# Patient Record
Sex: MALE | Race: WHITE
[De-identification: names, ages, dates, MRNs, and addresses within clinical notes are randomized per-mention and may not be internally consistent; named-entity substitution may affect disease eponyms.]

---

## 2019-04-08 ENCOUNTER — HOSPITAL ENCOUNTER (OUTPATIENT)
Dept: HOSPITAL 75 - SDC | Age: 44
Discharge: HOME | End: 2019-04-08
Attending: NURSE PRACTITIONER
Payer: COMMERCIAL

## 2019-04-08 VITALS — WEIGHT: 236 LBS | HEIGHT: 74 IN | BODY MASS INDEX: 30.29 KG/M2

## 2019-04-08 VITALS — DIASTOLIC BLOOD PRESSURE: 94 MMHG | SYSTOLIC BLOOD PRESSURE: 141 MMHG

## 2019-04-08 DIAGNOSIS — R07.9: ICD-10-CM

## 2019-04-08 DIAGNOSIS — R06.00: ICD-10-CM

## 2019-04-08 DIAGNOSIS — M79.89: ICD-10-CM

## 2019-04-08 DIAGNOSIS — E86.0: Primary | ICD-10-CM

## 2019-04-08 LAB
ALBUMIN SERPL-MCNC: 4.5 GM/DL (ref 3.2–4.5)
ALP SERPL-CCNC: 73 U/L (ref 40–136)
ALT SERPL-CCNC: 46 U/L (ref 0–55)
APTT PPP: YELLOW S
BACTERIA #/AREA URNS HPF: (no result) /HPF
BILIRUB SERPL-MCNC: 1.8 MG/DL (ref 0.1–1)
BILIRUB UR QL STRIP: NEGATIVE
BUN/CREAT SERPL: 18
CALCIUM SERPL-MCNC: 9.6 MG/DL (ref 8.5–10.1)
CHLORIDE SERPL-SCNC: 107 MMOL/L (ref 98–107)
CO2 SERPL-SCNC: 24 MMOL/L (ref 21–32)
CREAT SERPL-MCNC: 0.77 MG/DL (ref 0.6–1.3)
ERYTHROCYTE [DISTWIDTH] IN BLOOD BY AUTOMATED COUNT: 13.3 % (ref 10–14.5)
FIBRINOGEN PPP-MCNC: CLEAR MG/DL
GFR SERPLBLD BASED ON 1.73 SQ M-ARVRAT: > 60 ML/MIN
GLUCOSE SERPL-MCNC: 107 MG/DL (ref 70–105)
GLUCOSE UR STRIP-MCNC: NEGATIVE MG/DL
HCT VFR BLD CALC: 45 % (ref 40–54)
HGB BLD-MCNC: 15.7 G/DL (ref 13.3–17.7)
KETONES UR QL STRIP: NEGATIVE
LEUKOCYTE ESTERASE UR QL STRIP: NEGATIVE
MCH RBC QN AUTO: 33 PG (ref 25–34)
MCHC RBC AUTO-ENTMCNC: 35 G/DL (ref 32–36)
MCV RBC AUTO: 95 FL (ref 80–99)
NITRITE UR QL STRIP: NEGATIVE
PH UR STRIP: 8 [PH] (ref 5–9)
PLATELET # BLD: 124 10^3/UL (ref 130–400)
PMV BLD AUTO: 10 FL (ref 7.4–10.4)
POTASSIUM SERPL-SCNC: 4 MMOL/L (ref 3.6–5)
PROT SERPL-MCNC: 6.9 GM/DL (ref 6.4–8.2)
PROT UR QL STRIP: NEGATIVE
RBC #/AREA URNS HPF: (no result) /HPF
SODIUM SERPL-SCNC: 138 MMOL/L (ref 135–145)
SP GR UR STRIP: 1.01 (ref 1.02–1.02)
SQUAMOUS #/AREA URNS HPF: (no result) /HPF
UROBILINOGEN UR-MCNC: 1 MG/DL
WBC # BLD AUTO: 5 10^3/UL (ref 4.3–11)
WBC #/AREA URNS HPF: (no result) /HPF

## 2019-04-08 PROCEDURE — 84484 ASSAY OF TROPONIN QUANT: CPT

## 2019-04-08 PROCEDURE — 96360 HYDRATION IV INFUSION INIT: CPT

## 2019-04-08 PROCEDURE — 80053 COMPREHEN METABOLIC PANEL: CPT

## 2019-04-08 PROCEDURE — 85379 FIBRIN DEGRADATION QUANT: CPT

## 2019-04-08 PROCEDURE — 93005 ELECTROCARDIOGRAM TRACING: CPT

## 2019-04-08 PROCEDURE — 81000 URINALYSIS NONAUTO W/SCOPE: CPT

## 2019-04-08 PROCEDURE — 85027 COMPLETE CBC AUTOMATED: CPT

## 2019-04-08 PROCEDURE — 36415 COLL VENOUS BLD VENIPUNCTURE: CPT

## 2019-04-08 NOTE — NUR
PRIOR TO DISMISSAL, LABS RESULTS FAXED TO DR RACHEL'S OFFICE WITH PHONE CALL TO OFFICE TO 
VERIFY RECEIPT OF SAME.  NO NEW ORDERS, BRITTANY VASQUEZ TO CALL PRESCRIPTIONS TO PT'S 
PHARMACY.  INFORMED PT OF THIS.

## 2019-04-08 NOTE — DIAGNOSTIC IMAGING REPORT
CLINICAL INDICATION: Patient with left leg swelling.



COMPARISON: None.



PROCEDURE:



Real-time left lower extremity venous Doppler duplex evaluation

is performed from the inguinal region through the popliteal

fossa. The calf venous structures are also evaluated.



FINDINGS:



The deep venous system is well visualized and is easily

compressible. There is no evidence of deep venous thrombosis,

valvular incompetence, or significant collateral circulation.

There is soft tissue edema involving the left lower extremity.



IMPRESSION:

There is no ultrasound Doppler evidence of deep venous thrombosis

in the left lower extremity. 



Dictated by: 



  Dictated on workstation # [id number]

## 2019-04-25 ENCOUNTER — HOSPITAL ENCOUNTER (OUTPATIENT)
Dept: HOSPITAL 75 - CARD | Age: 44
End: 2019-04-25
Payer: COMMERCIAL

## 2019-04-25 DIAGNOSIS — I50.9: Primary | ICD-10-CM

## 2019-04-25 PROCEDURE — 93306 TTE W/DOPPLER COMPLETE: CPT

## 2020-06-15 ENCOUNTER — HOSPITAL ENCOUNTER (OUTPATIENT)
Dept: HOSPITAL 75 - LAB | Age: 45
End: 2020-06-15
Attending: FAMILY MEDICINE
Payer: SELF-PAY

## 2020-06-15 DIAGNOSIS — I10: Primary | ICD-10-CM
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
## 2020-06-15 DIAGNOSIS — M25.50: ICD-10-CM

## 2020-06-15 DIAGNOSIS — R53.83: ICD-10-CM

## 2020-06-15 DIAGNOSIS — R60.0: ICD-10-CM

## 2020-06-15 LAB
ALBUMIN SERPL-MCNC: 4.9 GM/DL (ref 3.2–4.5)
ALP SERPL-CCNC: 96 U/L (ref 40–136)
ALT SERPL-CCNC: 55 U/L (ref 0–55)
BASOPHILS # BLD AUTO: 0 10^3/UL (ref 0–0.1)
BASOPHILS NFR BLD AUTO: 0 % (ref 0–10)
BILIRUB SERPL-MCNC: 1.5 MG/DL (ref 0.1–1)
BUN/CREAT SERPL: 11
CALCIUM SERPL-MCNC: 9.5 MG/DL (ref 8.5–10.1)
CHLORIDE SERPL-SCNC: 104 MMOL/L (ref 98–107)
CO2 SERPL-SCNC: 22 MMOL/L (ref 21–32)
CREAT SERPL-MCNC: 0.88 MG/DL (ref 0.6–1.3)
EOSINOPHIL # BLD AUTO: 0.1 10^3/UL (ref 0–0.3)
EOSINOPHIL NFR BLD AUTO: 2 % (ref 0–10)
ERYTHROCYTE [DISTWIDTH] IN BLOOD BY AUTOMATED COUNT: 12.9 % (ref 10–14.5)
GFR SERPLBLD BASED ON 1.73 SQ M-ARVRAT: > 60 ML/MIN
GLUCOSE SERPL-MCNC: 110 MG/DL (ref 70–105)
HCT VFR BLD CALC: 44 % (ref 40–54)
HGB BLD-MCNC: 15.2 G/DL (ref 13.3–17.7)
LYMPHOCYTES # BLD AUTO: 0.9 X 10^3 (ref 1–4)
LYMPHOCYTES NFR BLD AUTO: 16 % (ref 12–44)
MANUAL DIFFERENTIAL PERFORMED BLD QL: NO
MCH RBC QN AUTO: 33 PG (ref 25–34)
MCHC RBC AUTO-ENTMCNC: 34 G/DL (ref 32–36)
MCV RBC AUTO: 95 FL (ref 80–99)
MONOCYTES # BLD AUTO: 0.6 X 10^3 (ref 0–1)
MONOCYTES NFR BLD AUTO: 10 % (ref 0–12)
NEUTROPHILS # BLD AUTO: 4.1 X 10^3 (ref 1.8–7.8)
NEUTROPHILS NFR BLD AUTO: 72 % (ref 42–75)
PLATELET # BLD: 142 10^3/UL (ref 130–400)
PMV BLD AUTO: 10.2 FL (ref 7.4–10.4)
POTASSIUM SERPL-SCNC: 4 MMOL/L (ref 3.6–5)
PROT SERPL-MCNC: 7.9 GM/DL (ref 6.4–8.2)
SODIUM SERPL-SCNC: 138 MMOL/L (ref 135–145)
WBC # BLD AUTO: 5.7 10^3/UL (ref 4.3–11)

## 2020-06-15 PROCEDURE — 86618 LYME DISEASE ANTIBODY: CPT

## 2020-06-15 PROCEDURE — 86668 FRANCISELLA TULARENSIS: CPT

## 2020-06-15 PROCEDURE — 36415 COLL VENOUS BLD VENIPUNCTURE: CPT

## 2020-06-15 PROCEDURE — 84443 ASSAY THYROID STIM HORMONE: CPT

## 2020-06-15 PROCEDURE — 80053 COMPREHEN METABOLIC PANEL: CPT

## 2020-06-15 PROCEDURE — 86666 EHRLICHIA ANTIBODY: CPT

## 2020-06-15 PROCEDURE — 86757 RICKETTSIA ANTIBODY: CPT

## 2020-06-15 PROCEDURE — 85025 COMPLETE CBC W/AUTO DIFF WBC: CPT

## 2021-07-18 ENCOUNTER — HOSPITAL ENCOUNTER (EMERGENCY)
Dept: HOSPITAL 75 - ER | Age: 46
Discharge: HOME | End: 2021-07-18
Payer: SELF-PAY

## 2021-07-18 VITALS — SYSTOLIC BLOOD PRESSURE: 110 MMHG | DIASTOLIC BLOOD PRESSURE: 69 MMHG

## 2021-07-18 VITALS — HEIGHT: 74.8 IN | BODY MASS INDEX: 29.64 KG/M2 | WEIGHT: 235.89 LBS

## 2021-07-18 DIAGNOSIS — J45.909: ICD-10-CM

## 2021-07-18 DIAGNOSIS — Z20.822: Primary | ICD-10-CM

## 2021-07-18 DIAGNOSIS — F17.210: ICD-10-CM

## 2021-07-18 LAB
ALBUMIN SERPL-MCNC: 4.7 GM/DL (ref 3.2–4.5)
ALP SERPL-CCNC: 76 U/L (ref 40–136)
ALT SERPL-CCNC: 62 U/L (ref 0–55)
APTT PPP: YELLOW S
BACTERIA #/AREA URNS HPF: NEGATIVE /HPF
BASOPHILS # BLD AUTO: 0 10^3/UL (ref 0–0.1)
BASOPHILS NFR BLD AUTO: 0 % (ref 0–10)
BASOPHILS NFR BLD MANUAL: 0 %
BILIRUB SERPL-MCNC: 2.1 MG/DL (ref 0.1–1)
BILIRUB UR QL STRIP: NEGATIVE
BUN/CREAT SERPL: 15
CALCIUM SERPL-MCNC: 9.4 MG/DL (ref 8.5–10.1)
CHLORIDE SERPL-SCNC: 99 MMOL/L (ref 98–107)
CO2 SERPL-SCNC: 26 MMOL/L (ref 21–32)
CREAT SERPL-MCNC: 0.85 MG/DL (ref 0.6–1.3)
EOSINOPHIL # BLD AUTO: 0.2 10^3/UL (ref 0–0.3)
EOSINOPHIL NFR BLD AUTO: 2 % (ref 0–10)
EOSINOPHIL NFR BLD MANUAL: 1 %
ERYTHROCYTE [SEDIMENTATION RATE] IN BLOOD: 4 MM/HR (ref 0–15)
FIBRINOGEN PPP-MCNC: CLEAR MG/DL
GFR SERPLBLD BASED ON 1.73 SQ M-ARVRAT: > 60 ML/MIN
GLUCOSE SERPL-MCNC: 116 MG/DL (ref 70–105)
GLUCOSE UR STRIP-MCNC: NEGATIVE MG/DL
HCT VFR BLD CALC: 45 % (ref 40–54)
HGB BLD-MCNC: 15.6 G/DL (ref 13.3–17.7)
KETONES UR QL STRIP: NEGATIVE
LEUKOCYTE ESTERASE UR QL STRIP: NEGATIVE
LYMPHOCYTES # BLD AUTO: 0.6 10^3/UL (ref 1–4)
LYMPHOCYTES NFR BLD AUTO: 7 % (ref 12–44)
MAGNESIUM SERPL-MCNC: 1.9 MG/DL (ref 1.6–2.4)
MANUAL DIFFERENTIAL PERFORMED BLD QL: YES
MCH RBC QN AUTO: 32 PG (ref 25–34)
MCHC RBC AUTO-ENTMCNC: 35 G/DL (ref 32–36)
MCV RBC AUTO: 92 FL (ref 80–99)
MONOCYTES # BLD AUTO: 0.8 10^3/UL (ref 0–1)
MONOCYTES NFR BLD AUTO: 9 % (ref 0–12)
MONOCYTES NFR BLD: 14 %
NEUTROPHILS # BLD AUTO: 7 10^3/UL (ref 1.8–7.8)
NEUTROPHILS NFR BLD AUTO: 82 % (ref 42–75)
NEUTS BAND NFR BLD MANUAL: 73 %
NEUTS BAND NFR BLD: 0 %
NITRITE UR QL STRIP: NEGATIVE
PH UR STRIP: 6 [PH] (ref 5–9)
PLATELET # BLD: 131 10^3/UL (ref 130–400)
PMV BLD AUTO: 10.6 FL (ref 9–12.2)
POTASSIUM SERPL-SCNC: 3.1 MMOL/L (ref 3.6–5)
PROT SERPL-MCNC: 7.5 GM/DL (ref 6.4–8.2)
PROT UR QL STRIP: NEGATIVE
RBC #/AREA URNS HPF: (no result) /HPF
RBC MORPH BLD: NORMAL
SODIUM SERPL-SCNC: 137 MMOL/L (ref 135–145)
SP GR UR STRIP: 1.01 (ref 1.02–1.02)
SQUAMOUS #/AREA URNS HPF: (no result) /HPF
VARIANT LYMPHS NFR BLD MANUAL: 12 %
WBC # BLD AUTO: 8.6 10^3/UL (ref 4.3–11)
WBC #/AREA URNS HPF: (no result) /HPF

## 2021-07-18 PROCEDURE — 71045 X-RAY EXAM CHEST 1 VIEW: CPT

## 2021-07-18 PROCEDURE — 85652 RBC SED RATE AUTOMATED: CPT

## 2021-07-18 PROCEDURE — 87205 SMEAR GRAM STAIN: CPT

## 2021-07-18 PROCEDURE — 93041 RHYTHM ECG TRACING: CPT

## 2021-07-18 PROCEDURE — 80053 COMPREHEN METABOLIC PANEL: CPT

## 2021-07-18 PROCEDURE — 81000 URINALYSIS NONAUTO W/SCOPE: CPT

## 2021-07-18 PROCEDURE — 83615 LACTATE (LD) (LDH) ENZYME: CPT

## 2021-07-18 PROCEDURE — 86141 C-REACTIVE PROTEIN HS: CPT

## 2021-07-18 PROCEDURE — 87636 SARSCOV2 & INF A&B AMP PRB: CPT

## 2021-07-18 PROCEDURE — 87040 BLOOD CULTURE FOR BACTERIA: CPT

## 2021-07-18 PROCEDURE — 84145 PROCALCITONIN (PCT): CPT

## 2021-07-18 PROCEDURE — 84484 ASSAY OF TROPONIN QUANT: CPT

## 2021-07-18 PROCEDURE — 85007 BL SMEAR W/DIFF WBC COUNT: CPT

## 2021-07-18 PROCEDURE — 87070 CULTURE OTHR SPECIMN AEROBIC: CPT

## 2021-07-18 PROCEDURE — 83735 ASSAY OF MAGNESIUM: CPT

## 2021-07-18 PROCEDURE — 36415 COLL VENOUS BLD VENIPUNCTURE: CPT

## 2021-07-18 PROCEDURE — 85379 FIBRIN DEGRADATION QUANT: CPT

## 2021-07-18 PROCEDURE — 83880 ASSAY OF NATRIURETIC PEPTIDE: CPT

## 2021-07-18 PROCEDURE — 83605 ASSAY OF LACTIC ACID: CPT

## 2021-07-18 PROCEDURE — 85027 COMPLETE CBC AUTOMATED: CPT

## 2021-07-18 NOTE — DIAGNOSTIC IMAGING REPORT
EXAMINATION: Chest radiograph, portable AP view.



DATE: 7/18/2021 10:18 AM



INDICATION: 45-year-old male, cough, hemoptysis.



COMPARISON: November 16, 2016.



FINDINGS: Stable overall appearance of the cardiomediastinal

silhouette. There is no identified pneumothorax. There is no

large pleural effusion. There is stable blunting of the left

lateral costophrenic angle since 2016. There is no interval focal

airspace consolidation.



IMPRESSION: No interval acute cardiopulmonary abnormality

identified.



Dictated by: 



  Dictated on workstation # [id number]

## 2021-07-18 NOTE — ED RESPIRATORY
General


Stated Complaint:  COUGHING UP BLOOD/FEVER


Source:  patient





History of Present Illness


Date Seen by Provider:  Jul 18, 2021


Time Seen by Provider:  09:01


Initial Comments


PT ARRIVES VIA POV FROM HOME


STATES HE BEGAN GETTING SICK ON THURSDAY 07/15/21


C/O COLD SYMPTOMS--ALOT OF NASAL CONGESTION AND DRAINAGE


C/O PRODUCTIVE COUGH-CLEAR SPUTUM, BUT BEGAN HAVING BLOOD-TINGED SPUTUM LAST PM 

--STATES HE COUGHED HARD ALL NIGHT


C/O SHORTNESS OF BREATH


NO CHEST PAIN 


C/O NAUSEA, NO VOMITING


HAS HAD FEVER--WAS UP .7 THIS AM


C/O BODY ACHES


C/O MILD HEADACHE


C/O LOSS OF TASTE





STATES HE DID HAVE COVID-19 IN NOVEMBER 2020, DID NOT REQUIRE HOSPITALIZATION OR

ANY TREATMENT. 


PT STATES THESE SYMPTOMS ARE THE SAME AS WHEN HE HAD COVID


PT DID RECEIVE BOTH MODERNA VACCINES IN MAY


DAUGHTER TESTED + FOR COVID-19 A FEW DAYS AGO -SHE LIVES IN [place], AND PT 

HAS BEEN AROUND HER IN THE LAST WEEK OR TWO. SHE WAS ALSO VACCINATED 





PT HAS HISTORY OF ATRIAL FLUTTER, AND HAS HAD ABLATIONS X 4





PCP: [facility]





Allergies and Home Medications


Allergies


Coded Allergies:  


     dronedarone (Unverified  Adverse Reaction, Unknown, 7/25/14)





Home Medications


Azithromycin 500 Mg Tablet, 500 MG PO DAILY


   Prescribed by: MAYLIN KENNEDY on 7/18/21 1034


Benzonatate 100 Mg Capsule, 200 MG PO TID


   Prescribed by: MAYLIN KENNEDY on 7/18/21 1034


Cefdinir 300 Mg Capsule, 300 MG PO BID


   Prescribed by: MAYLIN KENNEDY on 7/18/21 1034


Dexamethasone 6 Mg Tablet, 6 MG PO DAILY


   Prescribed by: MAYLIN KENNEDY on 7/18/21 1034


Esomeprazole Magnesium 20 Mg Capsule.dr, 20 MG PO DAILY, (Reported)


Guaifenesin/Dextromethorphan 1 Each Tbmp.12hr, 1 EACH PO BID


   Prescribed by: MAYLIN KENNEDY on 7/18/21 1034





Patient Home Medication List


Home Medication List Reviewed:  Yes





Review of Systems


Review of Systems


Constitutional:  see HPI, fever, malaise


EENTM:  see HPI, nose congestion


Respiratory:  see HPI, cough, hemoptysis, phlegm, short of breath


Cardiovascular:  no symptoms reported; No chest pain, No palpitations, No 

syncope


Gastrointestinal:  see HPI; No abdominal pain, No diarrhea; nausea; No vomiting


Genitourinary:  no symptoms reported


Musculoskeletal:  see HPI


Skin:  no symptoms reported


Psychiatric/Neurological:  Headache


Hematologic/Lymphatic:  No Symptoms Reported


Immunological/Allergic:  no symptoms reported





Past Medical-Social-Family Hx


Patient Social History


Tobacco Use?:  Yes (DID SMOKE 1/2 PPD, NOW SMOKES A FEW CIGARETTES A DAY)


Tobacco type used:  Cigarettes


Alcohol Use?:  Yes


Alcohol Frequency:  Once in a while





Immunizations Up To Date


Tetanus Booster (TDap):  Less than 5yrs





Past Medical History


Surgery/Hospitalization HX:  


INGUINAL HERNIA REPAIR


CARDIAC ABLATIONS X 4


PERICARDIAL WINDOW


VATS PROCEDURE WITH PLEURODESIS FOR PLEURAL EFFUSION


Surgeries:  Yes


Abdominal, Cardiac, Gallbladder


Respiratory:  Yes (COVID-19 11/2020;PLEURAL EFFUSION-S/P VATS/PLEURODESIS)


Asthma, Pneumonia


Cardiac:  Yes (ATRIAL FLUTTER--S/P ABLATIONS X 4; PERICARDIAL WINDOW)


Atrial Fibrillation, Chronic Edema/Swelling, Pericarditis


Neurological:  No


Reproductive Disorders:  No


Genitourinary:  Yes


Benign Prostatic Hyperpl


Gastrointestinal:  Yes (S/P CHOLECYSTECTOMY; INGUINAL HERNIA REPAIR)


Abdominal Hernia, Gall Bladder Disease


Musculoskeletal:  No


Endocrine:  No


HEENT:  No


Cancer:  No


Psychosocial:  No


Integumentary:  No


Blood Disorders:  No





Family Medical History





Alcoholism


  19 FATHER


Family history: Cardiovascular disease


  19 FATHER


Myocardial infarction


  19 FATHER


Heart Disease





Physical Exam





Vital Signs - First Documented








 7/18/21





 09:00


 


Temp 38.0


 


Pulse 87


 


Resp 16


 


B/P (MAP) 145/86 (105)


 


Pulse Ox 96


 


O2 Delivery Room Air





Capillary Refill :


Height: 6'2.00"


Weight: 236lbs. 0.0oz. [id number]; 29.5 BMI


Method:Estimated


General Appearance:  WD/WN, no apparent distress


HEENT:  PERRL/EOMI, pharynx normal, other (MUCH NASAL CONGESTION, CLEAR 

DRAINAGE)


Neck:  normal inspection


Respiratory:  normal breath sounds, no respiratory distress, no accessory muscle

use


Cardiovascular:  regular rate, rhythm, no murmur


Gastrointestinal:  non tender, soft


Extremities:  normal inspection, no pedal edema, normal capillary refill


Neurologic/Psychiatric:  no motor/sensory deficits, alert, oriented x 3


Skin:  normal color, warm/dry; No rash





Focused Exam


Lactate Level


7/18/21 09:24: Lactic Acid Level 1.29





Lactic Acid Level





Laboratory Tests








Test


 7/18/21


09:24


 


Lactic Acid Level


 1.29 MMOL/L


(0.50-2.00)











Progress/Results/Core Measures


Suspected Sepsis


SIRS


Temperature: 


Pulse:  


Respiratory Rate: 


 


Laboratory Tests


7/18/21 09:24: White Blood Count 8.6


Blood Pressure  / 


Mean: 


 





7/18/21 09:24: Lactic Acid Level 1.29


Laboratory Tests


7/18/21 09:24: 


Creatinine 0.85, Platelet Count 131, Total Bilirubin 2.1H








Results/Orders


Lab Results





Laboratory Tests








Test


 7/18/21


09:08 7/18/21


09:16 7/18/21


09:24 Range/Units


 


 


Influenza Type A (RT-PCR) Not Detected    Not Detecte  


 


Influenza Type B (RT-PCR) Not Detected    Not Detecte  


 


SARS-CoV-2 RNA (RT-PCR) Not Detected    Not Detecte  


 


Urine Color  YELLOW    


 


Urine Clarity  CLEAR    


 


Urine pH  6.0   5-9  


 


Urine Specific Gravity  1.015 L  1.016-1.022  


 


Urine Protein  NEGATIVE   NEGATIVE  


 


Urine Glucose (UA)  NEGATIVE   NEGATIVE  


 


Urine Ketones  NEGATIVE   NEGATIVE  


 


Urine Nitrite  NEGATIVE   NEGATIVE  


 


Urine Bilirubin  NEGATIVE   NEGATIVE  


 


Urine Urobilinogen  1.0   < = 1.0  MG/DL


 


Urine Leukocyte Esterase  NEGATIVE   NEGATIVE  


 


Urine RBC (Auto)  TRACE-I   NEGATIVE  


 


Urine RBC  NONE    /HPF


 


Urine WBC  NONE    /HPF


 


Urine Squamous Epithelial


Cells 


 RARE 


 


  /HPF





 


Urine Crystals  NONE    /LPF


 


Urine Bacteria  NEGATIVE    /HPF


 


Urine Casts  NONE    /LPF


 


Urine Mucus  NEGATIVE    /LPF


 


Urine Culture Indicated  NO    


 


White Blood Count


 


 


 8.6 


 4.3-11.0


10^3/uL


 


Red Blood Count


 


 


 4.87 


 4.30-5.52


10^6/uL


 


Hemoglobin   15.6  13.3-17.7  g/dL


 


Hematocrit   45  40-54  %


 


Mean Corpuscular Volume   92  80-99  fL


 


Mean Corpuscular Hemoglobin   32  25-34  pg


 


Mean Corpuscular Hemoglobin


Concent 


 


 35 


 32-36  g/dL





 


Red Cell Distribution Width   12.7  10.0-14.5  %


 


Platelet Count


 


 


 131 


 130-400


10^3/uL


 


Mean Platelet Volume   10.6  9.0-12.2  fL


 


Immature Granulocyte % (Auto)   0   %


 


Neutrophils (%) (Auto)   82 H 42-75  %


 


Lymphocytes (%) (Auto)   7 L 12-44  %


 


Monocytes (%) (Auto)   9  0-12  %


 


Eosinophils (%) (Auto)   2  0-10  %


 


Basophils (%) (Auto)   0  0-10  %


 


Neutrophils # (Auto)


 


 


 7.0 


 1.8-7.8


10^3/uL


 


Lymphocytes # (Auto)


 


 


 0.6 L


 1.0-4.0


10^3/uL


 


Monocytes # (Auto)


 


 


 0.8 


 0.0-1.0


10^3/uL


 


Eosinophils # (Auto)


 


 


 0.2 


 0.0-0.3


10^3/uL


 


Basophils # (Auto)


 


 


 0.0 


 0.0-0.1


10^3/uL


 


Immature Granulocyte # (Auto)


 


 


 0.0 


 0.0-0.1


10^3/uL


 


Neutrophils % (Manual)   73   %


 


Lymphocytes % (Manual)   12   %


 


Monocytes % (Manual)   14   %


 


Eosinophils % (Manual)   1   %


 


Basophils % (Manual)   0   %


 


Band Neutrophils   0   %


 


Percent Immature Platelet


Fraction 


 


 4.7 


 0.0-7.6  %





 


Blood Morphology Comment   NORMAL   


 


Erythrocyte Sedimentation Rate   4  0-15  MM/HR


 


D-Dimer


 


 


 0.48 


 0.00-0.49


UG/ML


 


Sodium Level   137  135-145  MMOL/L


 


Potassium Level   3.1 L 3.6-5.0  MMOL/L


 


Chloride Level   99    MMOL/L


 


Carbon Dioxide Level   26  21-32  MMOL/L


 


Anion Gap   12  5-14  MMOL/L


 


Blood Urea Nitrogen   13  7-18  MG/DL


 


Creatinine


 


 


 0.85 


 0.60-1.30


MG/DL


 


Estimat Glomerular Filtration


Rate 


 


 > 60 


  





 


BUN/Creatinine Ratio   15   


 


Glucose Level   116 H   MG/DL


 


Lactic Acid Level


 


 


 1.29 


 0.50-2.00


MMOL/L


 


Calcium Level   9.4  8.5-10.1  MG/DL


 


Corrected Calcium     8.5-10.1  MG/DL


 


Magnesium Level   1.9  1.6-2.4  MG/DL


 


Total Bilirubin   2.1 H 0.1-1.0  MG/DL


 


Aspartate Amino Transf


(AST/SGOT) 


 


 39 H


 5-34  U/L





 


Alanine Aminotransferase


(ALT/SGPT) 


 


 62 H


 0-55  U/L





 


Alkaline Phosphatase   76    U/L


 


Lactate Dehydrogenase   179  125-220  U/L


 


Troponin I   < 0.028  <0.028  NG/ML


 


C-Reactive Protein High


Sensitivity 


 


 3.20 H


 0.00-0.50


MG/DL


 


B-Type Natriuretic Peptide   89.5  <100.0  PG/ML


 


Total Protein   7.5  6.4-8.2  GM/DL


 


Albumin   4.7 H 3.2-4.5  GM/DL


 


Procalcitonin   0.04  <0.10  NG/ML








My Orders





Orders - MAYLIN KENNEDY DO


Chest 1 View, Ap/Pa Only (7/18/21 09:01)


Covid 19 Inhouse Test (7/18/21 09:01)


Influenza A And B By Pcr (7/18/21 09:01)


BNP (7/18/21 09:21)


Cbc With Automated Diff (7/18/21 09:21)


Comprehensive Metabolic Panel (7/18/21 09:21)


Hs C Reactive Protein (7/18/21 09:21)


Fibrin Degradation Products (7/18/21 09:21)


Lactic Acid Analyzer (7/18/21 09:21)


Magnesium (7/18/21 09:21)


Ua Culture If Indicated (7/18/21 09:21)


Blood Culture (7/18/21 09:21)


Erythrocyte Sedimentation Rate (7/18/21 09:21)


Troponin I (7/18/21 09:21)


Monitor-Rhythm Ecg Trace Only (7/18/21 09:21)


Procalcitonin (Pct) (7/18/21 09:21)


LDH (7/18/21 09:21)


Acetaminophen  Tablet (Tylenol  Tablet) (7/18/21 09:30)


Ibuprofen  Tablet (Motrin  Tablet) (7/18/21 09:30)


Dexamethasone Injection (Decadron Inje (7/18/21 09:30)


Manual Differential (7/18/21 09:24)


Sputum Culture (7/18/21 13:36)





Medications Given in ED





Current Medications








 Medications  Dose


 Ordered  Sig/Leonardo


 Route  Start Time


 Stop Time Status Last Admin


Dose Admin


 


 Acetaminophen  1,000 mg  ONCE  ONCE


 PO  7/18/21 09:30


 7/18/21 09:31 DC 7/18/21 09:34


1,000 MG


 


 Ibuprofen  800 mg  ONCE  ONCE


 PO  7/18/21 09:30


 7/18/21 09:31 DC 7/18/21 09:34


800 MG








Vital Signs/I&O











 7/18/21 7/18/21





 09:00 10:59


 


Temp 38.0 


 


Pulse 87 83


 


Resp 16 16


 


B/P (MAP) 145/86 (105) 110/69


 


Pulse Ox 96 97


 


O2 Delivery Room Air Room Air





Capillary Refill :


Progress Note :  


Progress Note


PLACED IN ISOLATION ROOM


PPE WORN AT ALL TIMES


COVID-19 TESTING DONE





PT ADVISED OF NEED FOR QUARANTINE





NO HYPOXIA


NO DYSPNEA


NO DETERIORATION IN PT'S CONDITION DURING ER STAY





Diagnostic Imaging





Comments


CXR--PER RADIOLOGIST REPORT AT 1031


FINDINGS: Stable overall appearance of the cardiomediastinal


silhouette. There is no identified pneumothorax. There is no


large pleural effusion. There is stable blunting of the left


lateral costophrenic angle since 2016. There is no interval focal


airspace consolidation.





IMPRESSION: No


   Reviewed:  Reviewed by Me





Departure


Impression





   Primary Impression:  


   COVID LIKE ILLNESS


   Additional Impressions:  


   Person under investigation for COVID-19


   Exposure to confirmed case of COVID-19


Disposition:  01 HOME, SELF-CARE


Condition:  Stable





Departure-Patient Inst.


Decision time for Depature:  10:31


Referrals:  


LUIS MANUEL RACHEL MD (PCP/Family)


Primary Care Physician


Patient Instructions:  COVID-19 Overview, Viral Upper Respiratory Infection, 

Adult (DC), Acute Bronchitis, Adult (DC)





Add. Discharge Instructions:  


TYLENOL 1 GRAM/ MOTRIN 800 MG 4 TIMES A DAY FOR PAIN OR FEVER





LOTS OF CLEAR LIQUIDS





CONTINUE YOUR REGULAR MEDICATIONS AS PRESCRIBED





FOLLOW UP WITH YOUR DR IN 3-4 DAYS FOR RECHECK--YOU MAY NEED TO BE RE-TESTED FOR

COVID-19 AT THAT TIME, IF YOU ARE STILL HAVING SYMPTOMS





QUARANTINE UNTIL YOU ARE RECHECKED AND CLEARED BY YOUR DR


Scripts


Guaifenesin/Dextromethorphan (Mucinex Dm ER 1,200-60 mg Tab) 1 Each Tbmp.12hr


1 EACH PO BID, #20 EA


   Prov: MAYLIN KENNEDY DO         7/18/21 


Benzonatate (TESSALON PERLES) 100 Mg Capsule


200 MG PO TID, #50 CAP


   Prov: MAYLIN KENNEDY DO         7/18/21 


Dexamethasone (Decadron) 6 Mg Tablet


6 MG PO DAILY, #10 TAB


   Prov: MAYLIN KENNEDY DO         7/18/21 


Azithromycin (Zithromax) 500 Mg Tablet


500 MG PO DAILY for 5 Days, #5 TAB


   Prov: MAYLIN KENNEDY DO         7/18/21 


Cefdinir (Cefdinir) 300 Mg Capsule


300 MG PO BID, #20 CAP


   Prov: MAYLIN KENNEDY DO         7/18/21











MAYLIN KENNEDY DO                 Jul 18, 2021 09:26

## 2023-07-12 ENCOUNTER — HOSPITAL ENCOUNTER (OUTPATIENT)
Dept: HOSPITAL 75 - PREOP | Age: 48
LOS: 1 days | End: 2023-07-13
Attending: INTERNAL MEDICINE
Payer: COMMERCIAL

## 2023-07-12 VITALS — HEIGHT: 75 IN | BODY MASS INDEX: 29.52 KG/M2 | WEIGHT: 237.44 LBS

## 2023-07-12 DIAGNOSIS — Z01.818: Primary | ICD-10-CM

## 2023-07-12 DIAGNOSIS — K21.9: ICD-10-CM

## 2023-07-13 NOTE — HISTORY AND PHYSICAL
PANENDOSCOPY H AND P



HISTORY OF PRESENT ILLNESS:

The patient is a 47-year-old white male, referred for screening colonoscopy.  He

is also being set up for diagnostic EGD.  He reports that he has required 

omeprazole for the last 8 years.  He has about a 20-pack-year smoking history 

and has a long history of drinking several beers each evening.  He has had no 

previous EGD evaluation and is at higher risk for Roberts's, reporting 

intermittent heartburn symptoms despite pantoprazole use.  He recently switched 

to Pepcid and does not feel that his symptoms have gotten any worse or better.  

He denies dysphagia.  He did have an episode a little over a month ago where he 

had diarrhea after eating out at a local restaurant.  It lasted for 3 weeks.  It

was bad enough that he could not work for 5-6 days. Over the past week, his 

stools have returned to normal.  He had a friend who he ate with, who also had 

diarrhea, but it only lasted about 3 days.  He denied blood, did have some 

intermittent cramping that was mild.



PAST MEDICAL HISTORY:

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 

complications post-procedure.



PHYSICAL EXAMINATION:

GENERAL:  Reveals a white male, appeared to be in no acute distress.

VITAL SIGNS:  Blood pressure 118/78.  Weight was down 2 pounds from a month ago 

of 237.

HEENT:  Unremarkable.  Sclerae nonicteric.

CHEST:  Clear to auscultation.

CARDIOVASCULAR: Exam revealed a regular rate and rhythm without murmur, S3, or 

S4.

ABDOMEN:  Soft, supple without mass, organomegaly, or tenderness.

EXTREMITIES:  Revealed no cyanosis, clubbing or edema.  No rashes were noted.



LABORATORY DATA:

Blood tests including a CBC, chemistry panel, and tick titers were reviewed with

the patient and essentially unremarkable, save for slight elevation in blood 

sugar of 104, although I suspect this is normal because he does not think that 

this was fasting.  The patient reassured about what studies.



The patient is being set up for screening colonoscopy and diagnostic EGD due to 

history of refractory reflux with risk factors for Roberts's as noted above.  

Prep instructions were given and questions were answered.





Job ID: [id number]

DocumentID: [id number]

Dictated Date: 07/06/2023 11:32:25

Transcription Date: 07/06/2023 12:04:00

Dictated By: CHRISTI LIRIANO MD

## 2023-07-14 ENCOUNTER — HOSPITAL ENCOUNTER (OUTPATIENT)
Dept: HOSPITAL 75 - ENDO | Age: 48
Discharge: HOME | End: 2023-07-14
Attending: INTERNAL MEDICINE
Payer: COMMERCIAL

## 2023-07-14 VITALS — DIASTOLIC BLOOD PRESSURE: 68 MMHG | SYSTOLIC BLOOD PRESSURE: 103 MMHG

## 2023-07-14 VITALS — HEIGHT: 74.8 IN | WEIGHT: 237.44 LBS | BODY MASS INDEX: 29.83 KG/M2

## 2023-07-14 VITALS — SYSTOLIC BLOOD PRESSURE: 98 MMHG | DIASTOLIC BLOOD PRESSURE: 66 MMHG

## 2023-07-14 VITALS — DIASTOLIC BLOOD PRESSURE: 77 MMHG | SYSTOLIC BLOOD PRESSURE: 120 MMHG

## 2023-07-14 VITALS — DIASTOLIC BLOOD PRESSURE: 86 MMHG | SYSTOLIC BLOOD PRESSURE: 109 MMHG

## 2023-07-14 DIAGNOSIS — K21.9: ICD-10-CM

## 2023-07-14 DIAGNOSIS — D12.0: ICD-10-CM

## 2023-07-14 DIAGNOSIS — Z12.11: Primary | ICD-10-CM

## 2023-07-14 DIAGNOSIS — K44.9: ICD-10-CM

## 2023-07-14 DIAGNOSIS — Z87.891: ICD-10-CM

## 2023-07-14 DIAGNOSIS — K63.5: ICD-10-CM

## 2023-07-14 DIAGNOSIS — K31.89: ICD-10-CM

## 2023-07-14 NOTE — PROGRESS NOTE-POST OPERATIVE
Post-Procedure Note


Physician (s)/Assistant (s)


Physician


CHRISTI LIRIANO MD





Pre-Procedure Diagnosis


Pre-Procedure Diagnosis:  refractory reflux and screening colon





Post-Procedure Diagnosis


Post-operative diagnosis:  


the patient was placed in the left lateral decubitus position.  The


endoscope was inserted into the oral cavity and under direct visualization


the esophagus is intubated.  The endoscope passed down the esophagus to


stomach and into the second portion of the duodenum.  A careful inspection


was made as the endoscope was withdrawn.





Findings posterior pharynx epiglottis true and false vocal folds and


arytenoid aperture were unremarkable on gross inspection.  Proximal mid


and distal esophagus were unremarkable except for a small sliding hiatal


hernia biopsies from the GE junction were obtained and submitted for


histopathology.  There is no evidence for erosive esophagitis.  The cardia


and the fundus of the stomach are unremarkable.  Antral erythema was


present without evidence for ulceration and a biopsy was obtained and


submitted for Helicobacter and histopathology.  The pylorus the pyloric


channel and duodenal bulb were unremarkable.  There was a polypoidal


appearing area in the proximal second portion of the duodenum adjacent the


distal duodenal bulb that was photographed a biopsy was obtained and


submitted for histopathology.  The remainder of the second portion of the


duodenum was unremarkable.


Assessment:


There is no evidence for erosive esophagitis or overt evidence to suggest


Roberts's biopsies were obtained from the Z-line and submitted for


histopathology.  Antral erythema was present suggesting antral gastritis


biopsy was obtained and submitted for histopathology and Helicobacter


evaluation.  There was a polypoid looking area of just distal to the


duodenal bulb and the proximal portion of the second portion of the


duodenum was biopsied and submitted for histopathology.  We then proceeded


with colonoscopy.





Prior to undergoing colonoscopy digital rectal evaluation was performed.


Anal suture tone was normal and the perianal reflex was intact.  The


prostate was normal in size and a nodular digital inspection no


abnormalities noted on digital inspection of the anal canal or distal


rectal vault.  The colonoscope was then inserted into the rectum and a


direct physician advanced the cecum.  The cecum was identified by the


indication of the ileocecal valve and cecal strap.  Quality prep was fair


to fair amount of bilious somewhat thick liquid with no solid stool being


noted throughout the colon.  A careful inspection was made as the


colonoscope was withdrawn.





Findings there were no evidence for internal or external hemorrhoids.  The


rectum sigmoid colon descending colon and splenic flexure were


unremarkable.  A 4 mm sessile polyp was noted in the distal transverse


colon it was biopsied and ablated with hot forceps with no subsequent


blood loss.  The remainder the transverse colon splenic flexure and


ascending colon were unremarkable.  Present in the cecum was another 4 mm


sessile polyp was photographed biopsied and ablated with no blood loss.


A/P 1.  2 diminutive polyps were removed one from the distal transverse


colon and the other from the cecum without blood loss utilizing hot


forceps.  This is an otherwise normal colonoscopy to the cecum.  As long


as there are no surprises on histopathology report we will advocate


consideration for repeat screening colonoscopy in 10 years.











CHRISTI LIRIANO MD              Jul 14, 2023 09:55

## 2023-07-14 NOTE — ANESTHESIA-GENERAL POST-OP
MAC


Patient Condition


Mental Status/LOC:  Same as Preop


Cardiovascular:  Satisfactory


Nausea/Vomiting:  Absent


Respiratory:  Satisfactory


Pain:  Controlled


Complications:  Absent





Post Op Complications


Complications


None





Follow Up Care/Instructions


Patient Instructions


None needed.





Anesthesiology Discharge Order


Discharge Order


Patient is doing well, no complaints, stable vital signs, no apparent adverse 

anesthesia problems.   


No complications reported per nursing.











BRE SALAZAR CRNA            Jul 14, 2023 09:51

## 2023-07-14 NOTE — PRE-OP NOTE & CONSCIOUS SEDAT
Pre-Operative Progress Note


Date H&P Reviewed:  Jul 14, 2023


Time H&P Reviewed:  08:26


History & Physical:  H&P Reviewed, Patient Examed, No changes noted


Pre-Op Diagnosis:  refractory reflux and screening colon





Moderate Sedation PreProcedure


ASA Score


2














Airway 


 


Lungs 


 


Heart 


 


 ASA score


 


 ASA 1: a normal healthy patient


 


 ASA 2:  a patient with a mild systemic disease (mid diabetes, controlled 

hypertension, obesity 


 


 ASA 3:  a patient with a severe systemic disease that limits activity  (angina,

COPD, prior Myocardial infarction)


 


 ASA 4:  a patient with an incapacitating disease that is a constant threat to 

life (CHF, renal failure)


 


 ASA 5:  a moribund patient not expected to survive 24 hrs.  (ruptured aneurysm)


 


 ASA 6:  a declared brain-dead patient whose organs are being harvested.


 


 For emergent operations, add the letter E after the classification











Mallampati Classification


Grade 2





Sedation Plan


Analgesia, Amnesia, Plan communicated to team members, Discussed options with 

patient/fam, Discussed risks with patient/fam


The patient is an appropriate candidate to undergo the planned procedure, 

sedation, and anesthesia.





The patient immediately re-assessed prior to indication.











CHRISTI LIRIANO MD              Jul 14, 2023 08:27